# Patient Record
Sex: MALE | Race: WHITE | NOT HISPANIC OR LATINO | Employment: OTHER | URBAN - METROPOLITAN AREA
[De-identification: names, ages, dates, MRNs, and addresses within clinical notes are randomized per-mention and may not be internally consistent; named-entity substitution may affect disease eponyms.]

---

## 2018-12-14 ENCOUNTER — HOSPITAL ENCOUNTER (OUTPATIENT)
Dept: RADIOLOGY | Facility: HOSPITAL | Age: 83
Discharge: HOME | End: 2018-12-14
Attending: PSYCHIATRY & NEUROLOGY
Payer: MEDICARE

## 2018-12-14 ENCOUNTER — OFFICE VISIT (OUTPATIENT)
Dept: NEUROLOGY | Facility: CLINIC | Age: 83
End: 2018-12-14
Payer: MEDICARE

## 2018-12-14 VITALS
SYSTOLIC BLOOD PRESSURE: 122 MMHG | BODY MASS INDEX: 30.53 KG/M2 | WEIGHT: 190 LBS | RESPIRATION RATE: 16 BRPM | HEIGHT: 66 IN | DIASTOLIC BLOOD PRESSURE: 66 MMHG | HEART RATE: 72 BPM

## 2018-12-14 DIAGNOSIS — G62.9 NEUROPATHY: ICD-10-CM

## 2018-12-14 DIAGNOSIS — Z72.821 POOR SLEEP HYGIENE: ICD-10-CM

## 2018-12-14 DIAGNOSIS — R26.9 ABNORMALITY OF GAIT AND MOBILITY: ICD-10-CM

## 2018-12-14 DIAGNOSIS — F02.80 DEMENTIA DUE TO PARKINSON'S DISEASE WITHOUT BEHAVIORAL DISTURBANCE: ICD-10-CM

## 2018-12-14 DIAGNOSIS — G20.A1 DEMENTIA DUE TO PARKINSON'S DISEASE WITHOUT BEHAVIORAL DISTURBANCE: ICD-10-CM

## 2018-12-14 DIAGNOSIS — I69.30 LATE EFFECT OF CEREBROVASCULAR ACCIDENT (CVA): ICD-10-CM

## 2018-12-14 DIAGNOSIS — G20.A1 PARKINSON DISEASE: Primary | ICD-10-CM

## 2018-12-14 DIAGNOSIS — G20.A1 PARKINSON DISEASE: ICD-10-CM

## 2018-12-14 PROCEDURE — 99999 PR STA SHADOW: CPT | Mod: PBBFAC,,, | Performed by: PSYCHIATRY & NEUROLOGY

## 2018-12-14 PROCEDURE — 70450 CT HEAD/BRAIN W/O DYE: CPT | Mod: 26,,, | Performed by: RADIOLOGY

## 2018-12-14 PROCEDURE — 99999 PR PBB SHADOW E&M-NEW PATIENT-LVL III: CPT | Mod: PBBFAC,,, | Performed by: PSYCHIATRY & NEUROLOGY

## 2018-12-14 PROCEDURE — 99203 OFFICE O/P NEW LOW 30 MIN: CPT | Mod: PBBFAC | Performed by: PSYCHIATRY & NEUROLOGY

## 2018-12-14 PROCEDURE — 70450 CT HEAD/BRAIN W/O DYE: CPT | Mod: TC

## 2018-12-14 PROCEDURE — 99204 OFFICE O/P NEW MOD 45 MIN: CPT | Mod: S$PBB | Performed by: PSYCHIATRY & NEUROLOGY

## 2018-12-14 RX ORDER — DULOXETIN HYDROCHLORIDE 30 MG/1
30 CAPSULE, DELAYED RELEASE ORAL DAILY
Refills: 0 | COMMUNITY
Start: 2018-11-03

## 2018-12-14 RX ORDER — PRAVASTATIN SODIUM 10 MG/1
10 TABLET ORAL DAILY
Refills: 3 | COMMUNITY
Start: 2018-12-11

## 2018-12-14 RX ORDER — CARBIDOPA AND LEVODOPA 50; 200 MG/1; MG/1
1 TABLET, EXTENDED RELEASE ORAL 4 TIMES DAILY
Refills: 3 | COMMUNITY
Start: 2018-12-11

## 2018-12-14 RX ORDER — LISINOPRIL 20 MG/1
20 TABLET ORAL DAILY
Refills: 0 | COMMUNITY
Start: 2018-09-14

## 2018-12-14 RX ORDER — GABAPENTIN 100 MG/1
100 CAPSULE ORAL NIGHTLY
Refills: 5 | COMMUNITY
Start: 2018-11-29

## 2018-12-14 RX ORDER — LORAZEPAM 1 MG/1
0.5 TABLET ORAL 2 TIMES DAILY PRN
Refills: 2 | COMMUNITY
Start: 2018-12-11

## 2018-12-14 RX ORDER — FUROSEMIDE 20 MG/1
20 TABLET ORAL DAILY
Refills: 0 | COMMUNITY
Start: 2018-09-14

## 2018-12-14 RX ORDER — HYDROCODONE BITARTRATE AND ACETAMINOPHEN 10; 325 MG/1; MG/1
1 TABLET ORAL 2 TIMES DAILY
Refills: 0 | COMMUNITY
Start: 2018-11-29

## 2018-12-14 RX ORDER — QUETIAPINE FUMARATE 25 MG/1
12.5 TABLET, FILM COATED ORAL NIGHTLY
Refills: 2 | COMMUNITY
Start: 2018-11-29 | End: 2018-12-14

## 2018-12-14 NOTE — PROGRESS NOTES
HPI: Lamonte Guillermo is a 84 y.o. male who states he is self referred for evaluation of parkinson's and neuropathy  Daughter states she brought him in for a second opinion today (self referred).   He is currently seeing Dr Werner.     He is here with his daughter, Jessica, today. He lives with his wife and they have 24 hour supervision with sitters and their children.  He no longer drives.    He has a long history of back pain and neuropathy (he is diabetic) and has a neurostimulator placed. His hydrocodone is now maintained by PCP as neurostimulator does not work. The patient walks only a with walker and this has been the case for many years (over 10 years).    He was diagnosed with Parkinson's disease about 5 years ago based on symptoms of tremor. He is on sinemet for this and this has helped his tremor greatly.     He seems to drag his right arm and leg from a prior stroke in 1995 which had improved- the patient has not had any brain imaging in years, but has had weakness on the right side worse 5 years ago.     He often opens each door in his kitchen in sequence at home and does this all day long when he passes this area. He does have some disorientation to date, but can ask about appointments (and does so daily-he has some short term memory loss but good short term memory). He recognizes his entire family.     He has home health PRN- last so 3 months ago with PT/OT    He is on seroquel to try to help with his sleep for the past 3 months and he seems to have declined slowly but not clearly in 3 months. He naps several times during the day and up to 2 hours at times. He does not sleep well due to having to urinate throughout the night. He has no hallucinations.   He sleeps in a chair in front a TV and dozes at midnight and dozes off an on all night until 5 am and during the day.   He does not have abnormal dream sleep.  His mood is usually very calm.     He sees his PCP q 3months for routine labs.         Review  "of Systems   Constitutional: Negative for fever.   HENT: Negative for nosebleeds.    Eyes: Negative for double vision.   Respiratory: Negative for hemoptysis.    Cardiovascular: Negative for leg swelling.   Gastrointestinal: Negative for blood in stool.   Genitourinary: Negative for hematuria.   Musculoskeletal: Positive for falls.        He had a near fall today (nearly sliding out of the wheelchair) but falls about 4 times per year.    Skin: Negative for rash.   Neurological: Positive for tremors.   Psychiatric/Behavioral: Positive for memory loss.         I have reviewed all of this patient's past medical and surgical histories as well as family and social histories and active allergies and medications as documented in the electronic medical record.        Exam:  Gen Appearance, well developed/nourished in no apparent distress  CV: 2+ distal pulses with no edema or swelling  Neuro:  MS: Awake, alert, oriented to place, person, time as "must be close to viola" , and not fully to situation. Sustains attention. Recent recall is easily forgotten/remote memory intact better (but can't name any of  his grandchildren without extreme help from daughter), Language is full to spontaneous speech/repetition/naming/comprehension. Fund of Knowledge is full  CN: Optic discs are flat with normal vasculature, PERRL, Extraoccular movements and visual fields are full. Normal facial sensation and strength, Hearing symmetric, Tongue and Palate are midline and strong. Shoulder Shrug symmetric and strong.  Motor: Normal bulk, tone, but only a mild tremor. 5/5 strength bilateral upper/lower extremities  But 4-/5 in the right hand movements and hip flexion with 2+ reflexes and no clonus  Sensory: difficulty with cooperating but reduced to vibration on the right. Romberg not done  Cerebellar: Can't well cooperate  Gait: Patient can walk only with his walker- he is in a wheelchair today.           Assessment/Plan: Lamonte Guillermo is a 84 " y.o. male with a prior stroke causing right sided weakness in 1995, also neuropathy in the legs, and parkinson's disease.   I recommend:   1. Try to obtain his records from Dr Werner including any  EMGs (this was reportedly done very remotely), last clinic  2. CT head needed as he seems to have worse right sided weakness per daughter over the last 5 years.   3. Check B12 level   4. I assume his neuropathy is due to Diabetes- better control long term needed to prevent worsening of neuropath- daughter reports he is sometimes poorly controlled)  5. His tremor responds well to sinemet- continue current dose of sinemet  6. He has a long history of lower back pain and gait difficulty. Neurostimulator in situ noted, with batter change needed to continue to work, but he declined. He has hydrocodone for his lower back and neuropathy filled by PCP. He does also take Cymbalta with PCP  7. He uses home health as needed for gait improvement  8. I recommended stopping  seroquel which does not help his sleep and could worsen his parkinson's. He has poor sleep hygiene and would benefit from less napping during the day.  He would benefit from a better sleep environment. He should sleep in a bed not chair if able. He should take less naps during the day. He should not watch TV prior to sleep.   8. If there is no worsening by CT this patient has progressive parkinson's with some dementia. Long term planning discussed with family. He is always supervised by family or sitters.   RTC with me in 6 months vs with Dr Werner as scheduled.  Daughter was asked to call for results.

## 2018-12-17 ENCOUNTER — TELEPHONE (OUTPATIENT)
Dept: NEUROLOGY | Facility: CLINIC | Age: 83
End: 2018-12-17

## 2018-12-17 NOTE — TELEPHONE ENCOUNTER
Notify patient's daughter. I was able to compare his current scan to his past scans. There is no change in the stroke he had prior. It is large, but unchanged.  We don't have to work that up any further.  Thank her for bringing the disc.    MD note: This is a parietal stroke chronically. If follows the left MCA vascular territory without JUAN or PCA involvement and is unchanged.